# Patient Record
Sex: FEMALE
[De-identification: names, ages, dates, MRNs, and addresses within clinical notes are randomized per-mention and may not be internally consistent; named-entity substitution may affect disease eponyms.]

---

## 2024-08-19 PROBLEM — Z00.00 ENCOUNTER FOR PREVENTIVE HEALTH EXAMINATION: Status: ACTIVE | Noted: 2024-08-19

## 2024-08-26 ENCOUNTER — APPOINTMENT (OUTPATIENT)
Dept: ENDOCRINOLOGY | Facility: CLINIC | Age: 29
End: 2024-08-26
Payer: MEDICAID

## 2024-08-26 VITALS
DIASTOLIC BLOOD PRESSURE: 73 MMHG | SYSTOLIC BLOOD PRESSURE: 113 MMHG | HEART RATE: 84 BPM | WEIGHT: 188 LBS | HEIGHT: 62 IN | BODY MASS INDEX: 34.6 KG/M2

## 2024-08-26 DIAGNOSIS — Z83.3 FAMILY HISTORY OF DIABETES MELLITUS: ICD-10-CM

## 2024-08-26 DIAGNOSIS — Z82.49 FAMILY HISTORY OF ISCHEMIC HEART DISEASE AND OTHER DISEASES OF THE CIRCULATORY SYSTEM: ICD-10-CM

## 2024-08-26 DIAGNOSIS — Z82.3 FAMILY HISTORY OF STROKE: ICD-10-CM

## 2024-08-26 DIAGNOSIS — Z80.0 FAMILY HISTORY OF MALIGNANT NEOPLASM OF DIGESTIVE ORGANS: ICD-10-CM

## 2024-08-26 DIAGNOSIS — E66.09 OTHER OBESITY DUE TO EXCESS CALORIES: ICD-10-CM

## 2024-08-26 DIAGNOSIS — R73.03 PREDIABETES.: ICD-10-CM

## 2024-08-26 DIAGNOSIS — Z83.49 FAMILY HISTORY OF OTHER ENDOCRINE, NUTRITIONAL AND METABOLIC DISEASES: ICD-10-CM

## 2024-08-26 DIAGNOSIS — E78.5 HYPERLIPIDEMIA, UNSPECIFIED: ICD-10-CM

## 2024-08-26 PROCEDURE — 99204 OFFICE O/P NEW MOD 45 MIN: CPT | Mod: 25

## 2024-08-26 PROCEDURE — G2211 COMPLEX E/M VISIT ADD ON: CPT | Mod: NC

## 2024-08-26 RX ORDER — MULTIVIT-MIN/FOLIC/VIT K/LYCOP 400-300MCG
TABLET ORAL
Refills: 0 | Status: ACTIVE | COMMUNITY

## 2024-08-26 NOTE — PHYSICAL EXAM
[Alert] : alert [Well Nourished] : well nourished [Obese] : obese [No Acute Distress] : no acute distress [Well Developed] : well developed [Normal Sclera/Conjunctiva] : normal sclera/conjunctiva [EOMI] : extra ocular movement intact [No Proptosis] : no proptosis [Thyroid Not Enlarged] : the thyroid was not enlarged [No Thyroid Nodules] : no palpable thyroid nodules [No Respiratory Distress] : no respiratory distress [No Accessory Muscle Use] : no accessory muscle use [Clear to Auscultation] : lungs were clear to auscultation bilaterally [Normal S1, S2] : normal S1 and S2 [Normal Rate] : heart rate was normal [Regular Rhythm] : with a regular rhythm [No Edema] : no peripheral edema [Normal Bowel Sounds] : normal bowel sounds [Not Tender] : non-tender [Not Distended] : not distended [Soft] : abdomen soft [No Stigmata of Cushings Syndrome] : no stigmata of Cushings Syndrome [No Rash] : no rash [Oriented x3] : oriented to person, place, and time [Acanthosis Nigricans] : no acanthosis nigricans

## 2024-08-26 NOTE — HISTORY OF PRESENT ILLNESS
[FreeTextEntry1] : Ms. Isabel Ashford is a 28-year-old woman with prediabetes, hyperlipidemia, and class 1 obesity who presents to the clinic to establish care.  She reports that her issues with weight started when she was very young. Since reaching adulthood, her lowest weight has been 175 lbs, and her current weight is 188 lbs. She has been trying to control her meal portions and walks her dog approximately 20 blocks daily. She also uses a machine that shakes her body for 30 minutes three times per week and has had laser treatment for weight loss (9 sessions, lost 3 oz). Unfortunately, she has not lost the weight she had expected.  She is concerned that her obesity could be related to hormonal issues, such as problems with her thyroid. She reports experiencing some fatigue and cold intolerance but denies menstrual irregularities or constipation. She has had an IUD for the past 3 years.  DIET: - Breakfast: Greek yogurt (vanilla) with a glass of water OR a small bowl of cereal with a glass of 2% milk. - Lunch: 2 eggs and 2 slices of toast OR chicken and broccoli with white rice (from a Chinese restaurant) OR leftovers from the day before (chicken with rice, steak with rice, or mashed potatoes with pork). - Dinner: Lettuce, broccoli, a small portion of rice, and protein. - Snacks: Coffee (black with Splenda). - Soda/Juice: Denies. - Sweets: Occasionally has chocolate when she is close to her menstrual periods (1 chocolate bar per day for 2 days).

## 2024-08-26 NOTE — REVIEW OF SYSTEMS
[Fatigue] : fatigue [Easy Bruising] : a tendency for easy bruising [Recent Weight Gain (___ Lbs)] : recent weight gain: [unfilled] lbs [Cold Intolerance] : cold intolerance [Fever] : no fever [Chills] : no chills [Chest Pain] : no chest pain [Shortness Of Breath] : no shortness of breath [Nausea] : no nausea [Vomiting] : no vomiting

## 2024-08-26 NOTE — ASSESSMENT
[FreeTextEntry1] : # Obesity Class 1 - We discussed lifestyle and diet modifications, including limiting the intake of takeout as it might be contributing to excess calories in her diet. I also advised her to engage in more demanding physical activities and incorporate strength training into her routine. We discussed possibly using a calorie-counting nhi to assess her caloric requirements. I explained that weight loss would help decrease the progression of her prediabetes. - The plan is for her to make these modifications as discussed and also see the nutritionist. We will also screen for thyroid disease and Cushing's syndrome. We will reassess her progress in three months and determine the next steps. Pharmacologic options are limited given her insurance doesn't cover anti-obesity drugs (e.g., Medicaid).  # Prediabetes / Hyperlipidemia - A1C 5.7%.  mg/dL.  - Diet and lifestyle modification advised.   RTC in 3 months.

## 2024-08-30 LAB
T4 FREE SERPL-MCNC: 0.9 NG/DL
TSH SERPL-ACNC: 1.58 UIU/ML

## 2024-10-30 ENCOUNTER — APPOINTMENT (OUTPATIENT)
Dept: ENDOCRINOLOGY | Facility: CLINIC | Age: 29
End: 2024-10-30

## 2024-11-20 ENCOUNTER — APPOINTMENT (OUTPATIENT)
Dept: ENDOCRINOLOGY | Facility: CLINIC | Age: 29
End: 2024-11-20

## 2024-12-31 ENCOUNTER — APPOINTMENT (OUTPATIENT)
Dept: ENDOCRINOLOGY | Facility: CLINIC | Age: 29
End: 2024-12-31
Payer: MEDICAID

## 2024-12-31 ENCOUNTER — TRANSCRIPTION ENCOUNTER (OUTPATIENT)
Age: 29
End: 2024-12-31

## 2024-12-31 DIAGNOSIS — E78.5 HYPERLIPIDEMIA, UNSPECIFIED: ICD-10-CM

## 2024-12-31 DIAGNOSIS — R73.03 PREDIABETES.: ICD-10-CM

## 2024-12-31 DIAGNOSIS — E66.09 OBESITY, CLASS 1: ICD-10-CM

## 2024-12-31 DIAGNOSIS — E66.811 OBESITY, CLASS 1: ICD-10-CM

## 2024-12-31 PROCEDURE — 99214 OFFICE O/P EST MOD 30 MIN: CPT | Mod: 95

## 2024-12-31 PROCEDURE — G2211 COMPLEX E/M VISIT ADD ON: CPT | Mod: NC,95
